# Patient Record
Sex: FEMALE | Race: WHITE | Employment: UNEMPLOYED | URBAN - METROPOLITAN AREA
[De-identification: names, ages, dates, MRNs, and addresses within clinical notes are randomized per-mention and may not be internally consistent; named-entity substitution may affect disease eponyms.]

---

## 2020-09-22 LAB
CHLAMYDIA, EXTERNAL: NEGATIVE
HBSAG, EXTERNAL: NEGATIVE
N. GONORRHEA, EXTERNAL: NEGATIVE
RPR, EXTERNAL: NON REACTIVE
RUBELLA, EXTERNAL: NORMAL
TYPE, ABO & RH, EXTERNAL: NORMAL

## 2021-02-13 ENCOUNTER — HOSPITAL ENCOUNTER (EMERGENCY)
Age: 26
Discharge: HOME | End: 2021-02-13
Payer: COMMERCIAL

## 2021-02-13 VITALS
HEIGHT: 63 IN | WEIGHT: 240 LBS | HEART RATE: 118 BPM | DIASTOLIC BLOOD PRESSURE: 68 MMHG | SYSTOLIC BLOOD PRESSURE: 120 MMHG | TEMPERATURE: 97.7 F | RESPIRATION RATE: 14 BRPM | BODY MASS INDEX: 42.52 KG/M2

## 2021-02-13 LAB
APPEARANCE UR: CLEAR
BACTERIA URNS QL MICRO: NEGATIVE /HPF
BILIRUB UR QL: NEGATIVE
CLUE CELLS VAG QL WET PREP: NORMAL
COLOR UR: NORMAL
EPITH CASTS URNS QL MICRO: NORMAL /LPF
GLUCOSE UR STRIP.AUTO-MCNC: NEGATIVE MG/DL
HGB UR QL STRIP: NEGATIVE
HYALINE CASTS URNS QL MICRO: NORMAL /LPF (ref 0–5)
KETONES UR QL STRIP.AUTO: NEGATIVE MG/DL
KOH PREP SPEC: NORMAL
LEUKOCYTE ESTERASE UR QL STRIP.AUTO: NEGATIVE
NITRITE UR QL STRIP.AUTO: NEGATIVE
PH UR STRIP: 6 [PH] (ref 5–8)
PROT UR STRIP-MCNC: NEGATIVE MG/DL
RBC #/AREA URNS HPF: NORMAL /HPF (ref 0–5)
SERVICE CMNT-IMP: NORMAL
SP GR UR REFRACTOMETRY: 1 (ref 1–1.03)
T VAGINALIS VAG QL WET PREP: NORMAL
UA: UC IF INDICATED,UAUC: NORMAL
UROBILINOGEN UR QL STRIP.AUTO: 0.2 EU/DL (ref 0.2–1)
WBC URNS QL MICRO: NORMAL /HPF (ref 0–4)

## 2021-02-13 PROCEDURE — 87210 SMEAR WET MOUNT SALINE/INK: CPT

## 2021-02-13 PROCEDURE — 99283 EMERGENCY DEPT VISIT LOW MDM: CPT

## 2021-02-13 PROCEDURE — 81001 URINALYSIS AUTO W/SCOPE: CPT

## 2021-02-13 RX ORDER — ASPIRIN 81 MG/1
81 TABLET ORAL DAILY
COMMUNITY
End: 2021-04-11

## 2021-02-13 NOTE — ED PROVIDER NOTES
Chris Bone is a 29 yo  at [place] [address] with an GLENDA of 21. She presents to the ZOIE for vaginal bleeding. Reports she voided this AM and noted a small streak of pink on the toilet paper. She went on with her day and noted none on panties or in toilet. She again voided this afternoon and noted light pink on toilet paper again, none in toilet. Wore a pad into ZOIE no bleeding noted. Denies recent intercourse. Denies constipation. Denies cramps, ctx, LOF and endorses good fetal movement. No other concerns today. Prenatal care has been received at [facility] with Dr. Jason Jones. Pregnancy complicated by maternal obesity, taking a daily ASA, last EFW >99% at 32 weeks (LGA fetus). Pt with a family history of Downs Syndrome, normal genetic screening with this pregnancy. The history is provided by the patient. No past medical history on file. No past surgical history on file. No family history on file.     Social History     Socioeconomic History    Marital status: Not on file     Spouse name: Not on file    Number of children: Not on file    Years of education: Not on file    Highest education level: Not on file   Occupational History    Not on file   Social Needs    Financial resource strain: Not on file    Food insecurity     Worry: Not on file     Inability: Not on file    Transportation needs     Medical: Not on file     Non-medical: Not on file   Tobacco Use    Smoking status: Not on file   Substance and Sexual Activity    Alcohol use: Not on file    Drug use: Not on file    Sexual activity: Not on file   Lifestyle    Physical activity     Days per week: Not on file     Minutes per session: Not on file    Stress: Not on file   Relationships    Social connections     Talks on phone: Not on file     Gets together: Not on file     Attends Mu-ism service: Not on file     Active member of club or organization: Not on file     Attends meetings of clubs or organizations: Not on file Relationship status: Not on file    Intimate partner violence     Fear of current or ex partner: Not on file     Emotionally abused: Not on file     Physically abused: Not on file     Forced sexual activity: Not on file   Other Topics Concern    Not on file   Social History Narrative    Not on file         ALLERGIES: Patient has no allergy information on record. Review of Systems   Constitutional: Negative. HENT: Negative. Eyes: Negative. Respiratory: Negative. Cardiovascular: Negative. Gastrointestinal: Negative. Endocrine: Negative. Genitourinary: Positive for vaginal bleeding. Musculoskeletal: Negative. Skin: Negative. Allergic/Immunologic: Negative. Neurological: Negative. Hematological: Negative. Psychiatric/Behavioral: Negative. There were no vitals filed for this visit. Physical Exam  Vitals signs and nursing note reviewed. Exam conducted with a chaperone present. Constitutional:       Appearance: Normal appearance. She is obese. HENT:      Head: Normocephalic and atraumatic. Nose: Nose normal.      Mouth/Throat:      Mouth: Mucous membranes are moist.      Pharynx: Oropharynx is clear. Eyes:      Extraocular Movements: Extraocular movements intact. Neck:      Musculoskeletal: Normal range of motion and neck supple. Cardiovascular:      Rate and Rhythm: Normal rate and regular rhythm. Pulses: Normal pulses. Heart sounds: Normal heart sounds. Pulmonary:      Effort: Pulmonary effort is normal.      Breath sounds: Normal breath sounds. Abdominal:      Palpations: Abdomen is soft. Comments: gravid   Genitourinary:     Comments: Sterile speculum exam:    Cervix posterior, 0/0/high  No blood noted in vault, from cervix or anywhere  No blood noted externally on vulva    Hemorrhoid noted, no bleeding noted    No visible blood noted anywhere with exam  Musculoskeletal: Normal range of motion.    Skin:     General: Skin is warm and dry. Capillary Refill: Capillary refill takes less than 2 seconds. Neurological:      General: No focal deficit present. Mental Status: She is alert and oriented to person, place, and time. Mental status is at baseline. Psychiatric:         Mood and Affect: Mood normal.         Behavior: Behavior normal.         Thought Content: Thought content normal.         Judgment: Judgment normal.      NST: Monitored for 20 minutes, reactive, cat 1, baseline: 150, positive accels, no decels, moderate variability, no ctx noted    MDM  Number of Diagnoses or Management Options     Amount and/or Complexity of Data Reviewed  Clinical lab tests: ordered and reviewed  Decide to obtain previous medical records or to obtain history from someone other than the patient: yes  Review and summarize past medical records: yes  Independent visualization of images, tracings, or specimens: yes    Risk of Complications, Morbidity, and/or Mortality  Presenting problems: moderate  Diagnostic procedures: moderate  Management options: moderate    Critical Care  Total time providing critical care:  minutes    Patient Progress  Patient progress: stable    ED Course as of Feb 13 1822   Sat Feb 13, 2021   1624 Admit to ZOIE  NST  Sterile speculum exam  Wet Prep  Ua with reflex culture      [LA]   1819 DILIP, OTHER SOURCES:    Special Requests: NO SPECIAL REQUESTS   KOH NO YEAST SEEN [LA]   1819 URINALYSIS W/ REFLEX CULTURE:    Color YELLOW/STRAW   Appearance CLEAR   Specific gravity 1.005   pH (UA) 6.0   Protein Negative   Glucose Negative   Ketone Negative   Bilirubin Negative   Blood Negative   Urobilinogen 0.2   Nitrites Negative   Leukocyte Esterase Negative   UA:UC IF INDICATED CULTURE NOT INDICATED BY UA RESULT   WBC 0-4   RBC 0-5   Epithelial cells FEW   Bacteria Negative   Hyaline cast 0-2 [LA]   1819 Reviewed normal results and no bleeding noted. Okay to discharge home.    Reviewed in detail to return with any vaginal bleeding, lof, ctx or decreased fetal movement. Pt verbalized understanding. Discharged home. Keep next routine OB appt 2/22/21 or ZOIE PRN.    WET PREP:    Clue cells CLUE CELLS ABSENT   Wet prep NO TRICHOMONAS SEEN [LA]      ED Course User Index  [LA] Lucrecia Vogel CNM

## 2021-02-13 NOTE — PROGRESS NOTES
2/13/2021  4:07 PM Patient arrived from home with vaginal bleeding. Patient reports blood tinged fluid on the toilet paper when she wipes. No active bleeding, no leaking of fluid, no contractions. Patient reports good fetal movement. [address] at bedside. No blood noted on exam.    1830 Patient okay to discharge home. .    [id number] Patient discharged home with instructions. Verbalizes understanding.

## 2021-02-13 NOTE — DISCHARGE INSTRUCTIONS
Patient Education     Vaginal Bleeding After the First Trimester of Pregnancy: After Your Visit  Your Care Instructions  Several things can cause bleeding in the second or third trimester of pregnancy. Some of them are serious, but others are not a cause for worry. In some cases, the cause of bleeding is not known. You do not have one of the serious conditions that can cause bleeding in pregnancy. Your baby is fine. However, you need to contact your doctor about any future bleeding, which could be a sign of  labor or other problems. Follow-up care is a key part of your treatment and safety. Be sure to make and go to all appointments, and call your doctor if you are having problems. Its also a good idea to know your test results and keep a list of the medicines you take. How can you care for yourself at home? · Do not have sex until your doctor says it is safe. · Follow your doctor's instructions about how much activity is safe for you. When should you call for help? Call 911 anytime you think you may need emergency care. For example, call if:  · You passed out (lost consciousness). · You have severe vaginal bleeding. · You have severe pain in your belly or pelvis. · You have had fluid gushing or leaking from your vagina and you know or think the umbilical cord is bulging into your vagina. If this happens, immediately get down on your knees so your rear end (buttocks) is higher than your head. This will decrease the pressure on the cord until help arrives. Call your doctor now or seek immediate medical care if:  · You have signs of preeclampsia, such as:  ¨ Sudden swelling of your face, hands, or feet. ¨ New vision problems (such as dimness or blurring). ¨ A severe headache. · You have any vaginal bleeding. · You have belly pain or cramping. · You have a fever. · You have had regular contractions (with or without pain) for an hour.  This means that you have 8 or more within 1 hour or 4 or more in 20 minutes after you change your position and drink fluids. · You have a sudden release of fluid from the vagina. · You have low back pain or pelvic pressure that does not go away. · You notice that your baby has stopped moving or is moving much less than normal.  Watch closely for changes in your health, and be sure to contact your doctor if:  · You do not get better as expected. Where can you learn more? Go to [employer]  Enter I318 in the search box to learn more about \"Vaginal Bleeding After the First Trimester of Pregnancy: After Your Visit. \"   © 4097-7494 Healthwise, Incorporated. Care instructions adapted under license by [employer] (which disclaims liability or warranty for this information). This care instruction is for use with your licensed healthcare professional. If you have questions about a medical condition or this instruction, always ask your healthcare professional. [address] any warranty or liability for your use of this information. Content Version: 9.4.40471;  Last Revised: March 24, 2011

## 2021-03-16 LAB — GRBS, EXTERNAL: NEGATIVE

## 2021-03-26 ENCOUNTER — TRANSCRIBE ORDER (OUTPATIENT)
Dept: REGISTRATION | Age: 26
End: 2021-03-26

## 2021-03-26 ENCOUNTER — HOSPITAL ENCOUNTER (OUTPATIENT)
Dept: PREADMISSION TESTING | Age: 26
Discharge: HOME | End: 2021-03-26
Payer: COMMERCIAL

## 2021-03-26 DIAGNOSIS — Z01.812 PRE-PROCEDURE LAB EXAM: ICD-10-CM

## 2021-03-26 DIAGNOSIS — Z01.812 PRE-PROCEDURE LAB EXAM: Primary | ICD-10-CM

## 2021-03-26 PROCEDURE — U0003 INFECTIOUS AGENT DETECTION BY NUCLEIC ACID (DNA OR RNA); SEVERE ACUTE RESPIRATORY SYNDROME CORONAVIRUS 2 (SARS-COV-2) (CORONAVIRUS DISEASE [COVID-19]), AMPLIFIED PROBE TECHNIQUE, MAKING USE OF HIGH THROUGHPUT TECHNOLOGIES AS DESCRIBED BY CMS-2020-01-R: HCPCS

## 2021-03-27 LAB — SARS-COV-2, COV2NT: NOT DETECTED

## 2021-04-08 ENCOUNTER — HOSPITAL ENCOUNTER (INPATIENT)
Age: 26
LOS: 3 days | Discharge: HOME | End: 2021-04-11
Attending: OBSTETRICS & GYNECOLOGY | Admitting: OBSTETRICS & GYNECOLOGY
Payer: COMMERCIAL

## 2021-04-08 PROBLEM — O36.60X0 LARGE FOR GESTATIONAL AGE FETUS AFFECTING MANAGEMENT OF MOTHER: Status: ACTIVE | Noted: 2021-04-08

## 2021-04-08 PROBLEM — O99.210 OBESITY COMPLICATING PREGNANCY: Status: ACTIVE | Noted: 2021-04-08

## 2021-04-08 LAB
BASOPHILS # BLD: 0.1 K/UL (ref 0–0.1)
BASOPHILS NFR BLD: 1 % (ref 0–1)
DIFFERENTIAL METHOD BLD: ABNORMAL
EOSINOPHIL # BLD: 0.1 K/UL (ref 0–0.4)
EOSINOPHIL NFR BLD: 1 % (ref 0–7)
ERYTHROCYTE [DISTWIDTH] IN BLOOD BY AUTOMATED COUNT: 14.2 % (ref 11.5–14.5)
HCT VFR BLD AUTO: 31.5 % (ref 35–47)
HGB BLD-MCNC: 10.3 G/DL (ref 11.5–16)
IMM GRANULOCYTES # BLD AUTO: 0.1 K/UL (ref 0–0.04)
IMM GRANULOCYTES NFR BLD AUTO: 1 % (ref 0–0.5)
LYMPHOCYTES # BLD: 2 K/UL (ref 0.8–3.5)
LYMPHOCYTES NFR BLD: 16 % (ref 12–49)
MCH RBC QN AUTO: 25.9 PG (ref 26–34)
MCHC RBC AUTO-ENTMCNC: 32.7 G/DL (ref 30–36.5)
MCV RBC AUTO: 79.1 FL (ref 80–99)
MONOCYTES # BLD: 0.9 K/UL (ref 0–1)
MONOCYTES NFR BLD: 7 % (ref 5–13)
NEUTS SEG # BLD: 9.8 K/UL (ref 1.8–8)
NEUTS SEG NFR BLD: 74 % (ref 32–75)
NRBC # BLD: 0 K/UL (ref 0–0.01)
NRBC BLD-RTO: 0 PER 100 WBC
PLATELET # BLD AUTO: 224 K/UL (ref 150–400)
PMV BLD AUTO: 12.1 FL (ref 8.9–12.9)
RBC # BLD AUTO: 3.98 M/UL (ref 3.8–5.2)
WBC # BLD AUTO: 12.9 K/UL (ref 3.6–11)

## 2021-04-08 PROCEDURE — 77030028565 HC CATH CERV RIPNG BLN COOK -B

## 2021-04-08 PROCEDURE — 36415 COLL VENOUS BLD VENIPUNCTURE: CPT

## 2021-04-08 PROCEDURE — 3E033VJ INTRODUCTION OF OTHER HORMONE INTO PERIPHERAL VEIN, PERCUTANEOUS APPROACH: ICD-10-PCS | Performed by: MIDWIFE

## 2021-04-08 PROCEDURE — 59200 INSERT CERVICAL DILATOR: CPT

## 2021-04-08 PROCEDURE — 75410000002 HC LABOR FEE PER 1 HR

## 2021-04-08 PROCEDURE — 00HU33Z INSERTION OF INFUSION DEVICE INTO SPINAL CANAL, PERCUTANEOUS APPROACH: ICD-10-PCS | Performed by: ANESTHESIOLOGY

## 2021-04-08 PROCEDURE — 0KQM0ZZ REPAIR PERINEUM MUSCLE, OPEN APPROACH: ICD-10-PCS | Performed by: MIDWIFE

## 2021-04-08 PROCEDURE — 74011250636 HC RX REV CODE- 250/636: Performed by: OBSTETRICS & GYNECOLOGY

## 2021-04-08 PROCEDURE — 65270000029 HC RM PRIVATE

## 2021-04-08 PROCEDURE — 74011250636 HC RX REV CODE- 250/636: Performed by: ADVANCED PRACTICE MIDWIFE

## 2021-04-08 PROCEDURE — 85025 COMPLETE CBC W/AUTO DIFF WBC: CPT

## 2021-04-08 RX ORDER — OXYTOCIN/RINGER'S LACTATE 30/500 ML
10 PLASTIC BAG, INJECTION (ML) INTRAVENOUS AS NEEDED
Status: COMPLETED | OUTPATIENT
Start: 2021-04-08 | End: 2021-04-09

## 2021-04-08 RX ORDER — BUTORPHANOL TARTRATE 1 MG/ML
1 INJECTION INTRAMUSCULAR; INTRAVENOUS
Status: DISCONTINUED | OUTPATIENT
Start: 2021-04-08 | End: 2021-04-08

## 2021-04-08 RX ORDER — ONDANSETRON 2 MG/ML
4 INJECTION INTRAMUSCULAR; INTRAVENOUS
Status: DISCONTINUED | OUTPATIENT
Start: 2021-04-08 | End: 2021-04-10 | Stop reason: HOSPADM

## 2021-04-08 RX ORDER — SODIUM CHLORIDE 0.9 % (FLUSH) 0.9 %
5-40 SYRINGE (ML) INJECTION AS NEEDED
Status: DISCONTINUED | OUTPATIENT
Start: 2021-04-08 | End: 2021-04-11 | Stop reason: HOSPADM

## 2021-04-08 RX ORDER — SODIUM CHLORIDE, SODIUM LACTATE, POTASSIUM CHLORIDE, CALCIUM CHLORIDE 600; 310; 30; 20 MG/100ML; MG/100ML; MG/100ML; MG/100ML
125 INJECTION, SOLUTION INTRAVENOUS CONTINUOUS
Status: DISCONTINUED | OUTPATIENT
Start: 2021-04-08 | End: 2021-04-11 | Stop reason: HOSPADM

## 2021-04-08 RX ORDER — OXYTOCIN/RINGER'S LACTATE 30/500 ML
87.3 PLASTIC BAG, INJECTION (ML) INTRAVENOUS AS NEEDED
Status: DISCONTINUED | OUTPATIENT
Start: 2021-04-08 | End: 2021-04-11 | Stop reason: HOSPADM

## 2021-04-08 RX ORDER — SODIUM CHLORIDE 0.9 % (FLUSH) 0.9 %
5-40 SYRINGE (ML) INJECTION EVERY 8 HOURS
Status: DISCONTINUED | OUTPATIENT
Start: 2021-04-08 | End: 2021-04-11 | Stop reason: HOSPADM

## 2021-04-08 RX ORDER — NALOXONE HYDROCHLORIDE 0.4 MG/ML
0.4 INJECTION, SOLUTION INTRAMUSCULAR; INTRAVENOUS; SUBCUTANEOUS AS NEEDED
Status: DISCONTINUED | OUTPATIENT
Start: 2021-04-08 | End: 2021-04-10 | Stop reason: HOSPADM

## 2021-04-08 RX ORDER — BUTORPHANOL TARTRATE 1 MG/ML
2 INJECTION INTRAMUSCULAR; INTRAVENOUS
Status: COMPLETED | OUTPATIENT
Start: 2021-04-08 | End: 2021-04-09

## 2021-04-08 RX ORDER — OXYTOCIN/RINGER'S LACTATE 30/500 ML
1 PLASTIC BAG, INJECTION (ML) INTRAVENOUS
Status: DISCONTINUED | OUTPATIENT
Start: 2021-04-09 | End: 2021-04-09

## 2021-04-08 RX ORDER — ZOLPIDEM TARTRATE 5 MG/1
5 TABLET ORAL
Status: DISCONTINUED | OUTPATIENT
Start: 2021-04-08 | End: 2021-04-11 | Stop reason: HOSPADM

## 2021-04-08 RX ADMIN — BUTORPHANOL TARTRATE 1 MG: 1 INJECTION, SOLUTION INTRAMUSCULAR; INTRAVENOUS at 21:47

## 2021-04-08 RX ADMIN — PROMETHAZINE HYDROCHLORIDE 12.5 MG: 25 INJECTION INTRAMUSCULAR; INTRAVENOUS at 22:05

## 2021-04-08 RX ADMIN — BUTORPHANOL TARTRATE 1 MG: 1 INJECTION, SOLUTION INTRAMUSCULAR; INTRAVENOUS at 23:26

## 2021-04-08 RX ADMIN — SODIUM CHLORIDE, POTASSIUM CHLORIDE, SODIUM LACTATE AND CALCIUM CHLORIDE 500 ML: 600; 310; 30; 20 INJECTION, SOLUTION INTRAVENOUS at 21:45

## 2021-04-08 NOTE — H&P
History & Physical    Name: Rosa Iraheta MRN: [id number]  SSN: [id number]    YOB: 1995  Age: 30 y.o. Sex: female      Subjective:     Estimated Date of Delivery: 21  OB History    Para Term  AB Living   1             SAB TAB Ectopic Molar Multiple Live Births                    # Outcome Date GA Lbr Bronson/2nd Weight Sex Delivery Anes PTL Lv   1 Current                Ms. Denise Luna is admitted with pregnancy at 40w0d for induction of labor due to obesity and LGA baby at term. Baby noted to be 91st%ile on  (Pj Hairston). She is obese with a BMI of 41. Prenatal course was otherwise uncomplicated. Please see prenatal records for details. She notes active FM and denies labor complaints. History reviewed. No pertinent past medical history. History reviewed. No pertinent surgical history. Social History     Occupational History    Not on file   Tobacco Use    Smoking status: Never Smoker    Smokeless tobacco: Never Used   Substance and Sexual Activity    Alcohol use: Not Currently    Drug use: Never    Sexual activity: Not on file     History reviewed. No pertinent family history. No Known Allergies  Prior to Admission medications    Medication Sig Start Date End Date Taking? Authorizing Provider   PNV Comb #2-Iron-Omega 3-FA [id number] mg cmpk Take  by mouth. Yes Provider, Historical   aspirin delayed-release 81 mg tablet Take 81 mg by mouth daily. Yes Provider, Historical   doxylamine succinate (UNISOM) 25 mg tablet Take 25 mg by mouth nightly as needed.    Yes Provider, Historical        Review of Systems   Noted in HPI    Objective:     Vitals:  Vitals:    21 1612 21 1619   BP: 125/81    Pulse: (!) 136    Resp: 14    Temp: 98.1 °F (36.7 °C)    Weight:  117.9 kg (260 lb)        Physical Exam      Gen: NAD  Resp: non-labored  CV: RRR  Abd: soft, gravid, obese  Ext: mild b/l edema    EFW 9#    Cervix: 0WP/93%/-9, intact, cephalic    Category 1 fetatl heart tracing  Baseline 145, moderate variability, + accels, no decels      Prenatal Labs:    Lab Results   Component Value Date/Time    Rubella, External Immune 09/22/2020    GrBStrep, External Negative 03/16/2021    HBsAg, External Negative 09/22/2020    RPR, External Non reactive 09/22/2020    Gonorrhea, External Negative 09/22/2020    Chlamydia, External Negative 09/22/2020    ABO,Rh A Positive 09/22/2020          Impression/Plan:     Active Problems:    Pregnancy (4/8/2021)      Obesity complicating pregnancy (6/4/6258)      Large for gestational age fetus affecting management of mother (4/8/2021)       G1 40 0/7 IOL for obesity (BMI 39) and LGA baby (Marta Montoya)    Plan:  Admit for induction of labor. Group B Strep negative. Plan cook catheter placement and pitocin at 0400. Consented x 2.  COVID neg. Reviewed risks of shoulder dystocia (which had already been reviewed with the patient) and she accepts risks and would like to proceed forward with IOL.       Myron Levy MD

## 2021-04-08 NOTE — PROGRESS NOTES
4/8/2021  4:08 PM Patient arrived to [address] [place] [address] for scheduled induction for LGA and morbid obesity. Patient reports positive fetal movement and denies any bleeding or loss of fluid. 1710: Dr Paulie Youssef at bedside to see patient and discuss plan of care. SVE 1/60/-3. King bulb placed at this time, 60cc uterine, 60cc vaginal. Dr Paulie Youssef aware of MEWS score 3.  1915: Dr Paulie Youssef reviewed [address] tracing. Difficult to continiously trace due to maternal size. Per Dr Paulie Youssef, patient may come off monitors now and do an NST before bedtime and one before pitocin starts. 1940: Bedside and Verbal shift change report given to Leta AUSTIN (oncoming nurse) by Olivia Marie (offgoing nurse). Report included the following information SBAR, Kardex, Intake/Output, MAR, Accordion, Recent Results and Med Rec Status.

## 2021-04-08 NOTE — PROGRESS NOTES
[place] catheter placed with 60/60 inflated  Pt and baby tolerated well  Plan for pitocin at 0400  NST and if reactive ok for intermittent monitoring until pitocin starts    Dalila Gaffney MD

## 2021-04-09 ENCOUNTER — ANESTHESIA EVENT (OUTPATIENT)
Dept: LABOR AND DELIVERY | Age: 26
End: 2021-04-09
Payer: COMMERCIAL

## 2021-04-09 ENCOUNTER — ANESTHESIA (OUTPATIENT)
Dept: LABOR AND DELIVERY | Age: 26
End: 2021-04-09
Payer: COMMERCIAL

## 2021-04-09 PROCEDURE — 74011250636 HC RX REV CODE- 250/636: Performed by: ANESTHESIOLOGY

## 2021-04-09 PROCEDURE — 74011250636 HC RX REV CODE- 250/636: Performed by: ADVANCED PRACTICE MIDWIFE

## 2021-04-09 PROCEDURE — 74011000250 HC RX REV CODE- 250: Performed by: ANESTHESIOLOGY

## 2021-04-09 PROCEDURE — 75410000003 HC RECOV DEL/VAG/CSECN EA 0.5 HR

## 2021-04-09 PROCEDURE — 74011250636 HC RX REV CODE- 250/636: Performed by: OBSTETRICS & GYNECOLOGY

## 2021-04-09 PROCEDURE — 75410000002 HC LABOR FEE PER 1 HR

## 2021-04-09 PROCEDURE — 76060000078 HC EPIDURAL ANESTHESIA

## 2021-04-09 PROCEDURE — 74011250637 HC RX REV CODE- 250/637: Performed by: MIDWIFE

## 2021-04-09 PROCEDURE — 75410000000 HC DELIVERY VAGINAL/SINGLE

## 2021-04-09 PROCEDURE — 65270000029 HC RM PRIVATE

## 2021-04-09 PROCEDURE — 74011000250 HC RX REV CODE- 250: Performed by: OBSTETRICS & GYNECOLOGY

## 2021-04-09 RX ORDER — FENTANYL CITRATE 50 UG/ML
INJECTION, SOLUTION INTRAMUSCULAR; INTRAVENOUS
Status: COMPLETED
Start: 2021-04-09 | End: 2021-04-09

## 2021-04-09 RX ORDER — OXYTOCIN/RINGER'S LACTATE 30/500 ML
87.3 PLASTIC BAG, INJECTION (ML) INTRAVENOUS AS NEEDED
Status: DISCONTINUED | OUTPATIENT
Start: 2021-04-09 | End: 2021-04-11 | Stop reason: HOSPADM

## 2021-04-09 RX ORDER — SODIUM CHLORIDE 0.9 % (FLUSH) 0.9 %
5-40 SYRINGE (ML) INJECTION EVERY 8 HOURS
Status: DISCONTINUED | OUTPATIENT
Start: 2021-04-09 | End: 2021-04-11 | Stop reason: HOSPADM

## 2021-04-09 RX ORDER — OXYTOCIN/RINGER'S LACTATE 30/500 ML
0-20 PLASTIC BAG, INJECTION (ML) INTRAVENOUS
Status: DISCONTINUED | OUTPATIENT
Start: 2021-04-09 | End: 2021-04-11 | Stop reason: HOSPADM

## 2021-04-09 RX ORDER — SODIUM CHLORIDE 0.9 % (FLUSH) 0.9 %
5-40 SYRINGE (ML) INJECTION AS NEEDED
Status: DISCONTINUED | OUTPATIENT
Start: 2021-04-09 | End: 2021-04-11 | Stop reason: HOSPADM

## 2021-04-09 RX ORDER — ACETAMINOPHEN 325 MG/1
650 TABLET ORAL
Status: DISCONTINUED | OUTPATIENT
Start: 2021-04-09 | End: 2021-04-11 | Stop reason: HOSPADM

## 2021-04-09 RX ORDER — FENTANYL CITRATE 50 UG/ML
INJECTION, SOLUTION INTRAMUSCULAR; INTRAVENOUS AS NEEDED
Status: DISCONTINUED | OUTPATIENT
Start: 2021-04-09 | End: 2021-04-09 | Stop reason: HOSPADM

## 2021-04-09 RX ORDER — HYDROCORTISONE 1 %
CREAM (GRAM) TOPICAL AS NEEDED
Status: DISCONTINUED | OUTPATIENT
Start: 2021-04-09 | End: 2021-04-11 | Stop reason: HOSPADM

## 2021-04-09 RX ORDER — BUPIVACAINE HYDROCHLORIDE 2.5 MG/ML
30 INJECTION, SOLUTION EPIDURAL; INFILTRATION; INTRACAUDAL ONCE
Status: DISCONTINUED | OUTPATIENT
Start: 2021-04-09 | End: 2021-04-10 | Stop reason: HOSPADM

## 2021-04-09 RX ORDER — HYDROCODONE BITARTRATE AND ACETAMINOPHEN 5; 325 MG/1; MG/1
1 TABLET ORAL
Status: DISCONTINUED | OUTPATIENT
Start: 2021-04-09 | End: 2021-04-11 | Stop reason: HOSPADM

## 2021-04-09 RX ORDER — FENTANYL/BUPIVACAINE/NS/PF 2-1250MCG
1-16 PREFILLED PUMP RESERVOIR EPIDURAL CONTINUOUS
Status: DISCONTINUED | OUTPATIENT
Start: 2021-04-09 | End: 2021-04-11 | Stop reason: HOSPADM

## 2021-04-09 RX ORDER — FENTANYL CITRATE 50 UG/ML
100 INJECTION, SOLUTION INTRAMUSCULAR; INTRAVENOUS ONCE
Status: DISCONTINUED | OUTPATIENT
Start: 2021-04-09 | End: 2021-04-10 | Stop reason: HOSPADM

## 2021-04-09 RX ORDER — AMMONIA 15 % (W/V)
1 AMPUL (EA) INHALATION AS NEEDED
Status: DISCONTINUED | OUTPATIENT
Start: 2021-04-09 | End: 2021-04-11 | Stop reason: HOSPADM

## 2021-04-09 RX ORDER — HYDROCORTISONE ACETATE PRAMOXINE HCL 2.5; 1 G/100G; G/100G
CREAM TOPICAL AS NEEDED
Status: DISCONTINUED | OUTPATIENT
Start: 2021-04-09 | End: 2021-04-11 | Stop reason: HOSPADM

## 2021-04-09 RX ORDER — HYDROCODONE BITARTRATE AND ACETAMINOPHEN 7.5; 325 MG/1; MG/1
1 TABLET ORAL
Status: DISCONTINUED | OUTPATIENT
Start: 2021-04-09 | End: 2021-04-11 | Stop reason: HOSPADM

## 2021-04-09 RX ORDER — ONDANSETRON 4 MG/1
4 TABLET, ORALLY DISINTEGRATING ORAL
Status: DISCONTINUED | OUTPATIENT
Start: 2021-04-09 | End: 2021-04-11 | Stop reason: HOSPADM

## 2021-04-09 RX ORDER — BUPIVACAINE HYDROCHLORIDE 2.5 MG/ML
INJECTION, SOLUTION EPIDURAL; INFILTRATION; INTRACAUDAL
Status: COMPLETED
Start: 2021-04-09 | End: 2021-04-09

## 2021-04-09 RX ORDER — IBUPROFEN 400 MG/1
800 TABLET ORAL EVERY 8 HOURS
Status: DISCONTINUED | OUTPATIENT
Start: 2021-04-09 | End: 2021-04-11 | Stop reason: HOSPADM

## 2021-04-09 RX ORDER — SIMETHICONE 80 MG
80 TABLET,CHEWABLE ORAL
Status: DISCONTINUED | OUTPATIENT
Start: 2021-04-09 | End: 2021-04-11 | Stop reason: HOSPADM

## 2021-04-09 RX ORDER — OXYTOCIN/RINGER'S LACTATE 30/500 ML
10 PLASTIC BAG, INJECTION (ML) INTRAVENOUS AS NEEDED
Status: DISCONTINUED | OUTPATIENT
Start: 2021-04-09 | End: 2021-04-11 | Stop reason: HOSPADM

## 2021-04-09 RX ORDER — NALOXONE HYDROCHLORIDE 0.4 MG/ML
0.4 INJECTION, SOLUTION INTRAMUSCULAR; INTRAVENOUS; SUBCUTANEOUS AS NEEDED
Status: DISCONTINUED | OUTPATIENT
Start: 2021-04-09 | End: 2021-04-10 | Stop reason: HOSPADM

## 2021-04-09 RX ORDER — DOCUSATE SODIUM 100 MG/1
100 CAPSULE, LIQUID FILLED ORAL
Status: DISCONTINUED | OUTPATIENT
Start: 2021-04-09 | End: 2021-04-11 | Stop reason: HOSPADM

## 2021-04-09 RX ORDER — ZOLPIDEM TARTRATE 5 MG/1
5 TABLET ORAL
Status: DISCONTINUED | OUTPATIENT
Start: 2021-04-09 | End: 2021-04-11 | Stop reason: HOSPADM

## 2021-04-09 RX ORDER — BUPIVACAINE HYDROCHLORIDE 5 MG/ML
30 INJECTION, SOLUTION EPIDURAL; INTRACAUDAL AS NEEDED
Status: DISCONTINUED | OUTPATIENT
Start: 2021-04-09 | End: 2021-04-10 | Stop reason: HOSPADM

## 2021-04-09 RX ORDER — EPHEDRINE SULFATE/0.9% NACL/PF 50 MG/5 ML
12.5 SYRINGE (ML) INTRAVENOUS
Status: DISCONTINUED | OUTPATIENT
Start: 2021-04-09 | End: 2021-04-10 | Stop reason: HOSPADM

## 2021-04-09 RX ORDER — BUPIVACAINE HYDROCHLORIDE 2.5 MG/ML
INJECTION, SOLUTION EPIDURAL; INFILTRATION; INTRACAUDAL AS NEEDED
Status: DISCONTINUED | OUTPATIENT
Start: 2021-04-09 | End: 2021-04-09 | Stop reason: HOSPADM

## 2021-04-09 RX ADMIN — BUTORPHANOL TARTRATE 2 MG: 1 INJECTION, SOLUTION INTRAMUSCULAR; INTRAVENOUS at 02:43

## 2021-04-09 RX ADMIN — OXYTOCIN 1 MILLI-UNITS/MIN: 10 INJECTION INTRAVENOUS at 04:29

## 2021-04-09 RX ADMIN — BUPIVACAINE HYDROCHLORIDE 5 ML: 2.5 INJECTION, SOLUTION EPIDURAL; INFILTRATION; INTRACAUDAL; PERINEURAL at 16:47

## 2021-04-09 RX ADMIN — OXYTOCIN 10000 MILLI-UNITS: 10 INJECTION INTRAVENOUS at 21:38

## 2021-04-09 RX ADMIN — BUPIVACAINE HYDROCHLORIDE 5 ML: 2.5 INJECTION, SOLUTION EPIDURAL; INFILTRATION; INTRACAUDAL; PERINEURAL at 16:49

## 2021-04-09 RX ADMIN — Medication 10 ML/HR: at 17:00

## 2021-04-09 RX ADMIN — IBUPROFEN 800 MG: 400 TABLET, FILM COATED ORAL at 22:57

## 2021-04-09 RX ADMIN — FENTANYL CITRATE 100 MCG: 50 INJECTION, SOLUTION INTRAMUSCULAR; INTRAVENOUS at 16:49

## 2021-04-09 RX ADMIN — FAMOTIDINE 20 MG: 10 INJECTION INTRAVENOUS at 20:29

## 2021-04-09 NOTE — PROGRESS NOTES
Labor Note    S: Pt feeling more pain with ctx. O:.  Visit Vitals  BP (!) 141/85 (BP 1 Location: Left upper arm, BP Patient Position: Sitting)   Pulse 100   Temp 98.3 °F (36.8 °C)   Resp 16   Wt 117.9 kg (260 lb)   Breastfeeding No   BMI 46.06 kg/m²     Gen- NAD  Abdomen- soft, gravid, NT  Cervix- 6/70/-2  AROM with return of clear fluid at 1545  Alsen- ctx q4 min, pit at 14 mU/min  FHTs- category 1    A/P: 29 y/o G1 with IUP at 40 1/7 wks undergoing IOL due to morbid obesity (BMI 41) and LGA baby (91st%ile on 4/5 (9lb3oz). GBS neg and COVID neg. Dystocia risks previously reviewed.    -Continue pit and continuous FM  -Epidural prn for pt comfort  -Mild range BPs, no sx, if persistent with send preE labs

## 2021-04-09 NOTE — PROGRESS NOTES
0050: Report received from [facility], RN.  7623: Patient up to bathroom.   9411: Report given to Adrian Robledo RN

## 2021-04-09 NOTE — ANESTHESIA PREPROCEDURE EVALUATION
Relevant Problems   ENDOCRINE   (+) Obesity complicating pregnancy       Anesthetic History   No history of anesthetic complications            Review of Systems / Medical History  Patient summary reviewed, nursing notes reviewed and pertinent labs reviewed    Pulmonary  Within defined limits                 Neuro/Psych   Within defined limits           Cardiovascular  Within defined limits                     GI/Hepatic/Renal  Within defined limits              Endo/Other        Morbid obesity     Other Findings              Physical Exam    Airway  Mallampati: II  TM Distance: > 6 cm  Neck ROM: normal range of motion   Mouth opening: Normal     Cardiovascular  Regular rate and rhythm,  S1 and S2 normal,  no murmur, click, rub, or gallop             Dental  No notable dental hx       Pulmonary  Breath sounds clear to auscultation               Abdominal  GI exam deferred       Other Findings            Anesthetic Plan    ASA: 3  Anesthesia type: epidural            Anesthetic plan and risks discussed with: Patient

## 2021-04-09 NOTE — PROGRESS NOTES
1130 Bedside and Verbal shift change report given to Kaur RNC (oncoming nurse) by Ziggy Hugo (offgoing nurse). Report included the following information SBAR, Kardex, Intake/Output, MAR, Accordion, Recent Results and Med Rec Status. 1535 Bedside and Verbal shift change report given to Hiram RNC (oncoming nurse) by Elizabeth Jovel (offgoing nurse). Report included the following information SBAR, Kardex, Intake/Output, MAR, Accordion, Recent Results and Med Rec Status.

## 2021-04-09 NOTE — PROGRESS NOTES
Labor Progress Note    S: Patient seen, fetal heart rate and contraction pattern evaluated. Feeling some stronger cramps. Wanting something for pain.     Physical Exam:  Patient Vitals for the past 4 hrs:   Temp Pulse Resp BP   21 2036 98.2 °F (36.8 °C) 96 17 135/87   21 1754 -- 100 -- 125/86         Cervical Exam: deferred, cook in place  Membranes:  Intact  Uterine Contractions:  Frequency: Irregular, mild to palpation, not currently on monitor  Fetal Heart Rate: previously 125s, +accels, neg decels, moderate variability      Assessment/Plan:    30 y.o.  at [facility] at term for obesity labor and LGA  Cat 1 tracing previously  GBS negative    P:  Assumed care of patient  Introduced self to patient  Continue present management as set by Dr. Sunita Langston  All questions asked/answered and patient agrees with plan    Alfonso Pantoja CNM

## 2021-04-09 NOTE — PROGRESS NOTES
1540: Bedside and Verbal shift change report given to [address] Siddiqui (oncoming nurse) by Alphonso Scanlon RN (offgoing nurse). Report included the following information SBAR, Intake/Output, MAR and Recent Results. 1545: Dr Yuan Newton at bedside to assess pt 6/70/-2.    1547: AROM, clear fluid. 1607: Pt requesting epidural. Bolus started. 1640: Dr Alec Manley at bedside for epidural placement. : Pt feeling constant rectal pressure    : lip/100/0    2015: 10/100/+1    : Begin pushing, RN at bedside, continuously monitoring FHR tracing    : RN at bedside, continuously monitoring FHR tracing    : RN at bedside, continuously monitoring FHR tracing    :  of live female  by [address] [place] [address]. Apgars 9/9.    0025: TRANSFER - OUT REPORT:    Verbal report given to MADELINE Corcoran RN(name) on Chadd Barrett  being transferred to MIU(unit) for routine progression of care       Report consisted of patients Situation, Background, Assessment and   Recommendations(SBAR). Information from the following report(s) SBAR, Intake/Output, MAR and Recent Results was reviewed with the receiving nurse. Lines:   Peripheral IV 21 Anterior;Distal;Left Wrist (Active)   Site Assessment Clean, dry, & intact 21 1203   Phlebitis Assessment 0 21 1203   Infiltration Assessment 0 21 1203   Dressing Status Clean, dry, & intact 21 1203   Dressing Type Transparent;Tape 21 1203   Hub Color/Line Status Pink; Infusing 21 1203        Opportunity for questions and clarification was provided.       Patient transported with:   Registered Nurse

## 2021-04-09 NOTE — ANESTHESIA PROCEDURE NOTES
Epidural Block    Patient location during procedure: OB  Start time: 4/9/2021 4:38 PM  End time: 4/9/2021 4:50 PM  Reason for block: labor epidural  Staffing  Performed: attending   Anesthesiologist: Inez Yin MD  Preanesthetic Checklist  Completed: patient identified, IV checked, site marked, risks and benefits discussed, surgical consent, monitors and equipment checked, pre-op evaluation and timeout performed  Block Placement  Patient position: sitting  Prep: ChloraPrep  Sterility prep: cap, drape, gloves, mask and hand  Sedation level: no sedation  Patient monitoring: continuous pulse oximetry, heart rate and frequent blood pressure checks  Approach: midline  Location: lumbar  Lumbar location: L4-L5  Epidural  Loss of resistance technique: saline  Guidance: landmark technique  Needle  Needle type: Tuohy   Needle gauge: 17 G  Needle length: 9 cm  Needle insertion depth: 7 cm  Catheter type: end hole  Catheter size: 19 G  Catheter at skin depth: 12 cm  Catheter securement method: clear occlusive dressing, liquid medical adhesive and surgical tape  Test dose: negative  Assessment  Sensory level: T6  Block outcome: pain improved  Number of attempts: 1  Procedure assessment: patient tolerated procedure well with no complications

## 2021-04-09 NOTE — PROGRESS NOTES
1940: Bedside shift change report given to FRANCISCO Bhatt RN (oncoming nurse) by NICA Guevara RN (offgoing nurse). Report included the following information SBAR.

## 2021-04-09 NOTE — PROGRESS NOTES
Pt seen and examined and case discussed at am rounds. Soha Goerge is a 31 y/o G1 with IUP at 40 1/7 wks undergoing IOL due to morbid obesity (BMI 41) and LGA baby (91st%ile on 4/5 (Adela Segura). GBS neg and COVID neg. Dystocia risks previously reviewed. Cervix was 1/60/-3 so cook catheter was placed last night. Pit was started at 0400 and is at 6 mU/min. FHTs category 1. Contractions irregular on toco. Pt comfortable. Elisha Oren catheter still in place. Will leave intact and continue pitocin. Plan to remove cook once regular contraction pattern established. AROM when amenable.

## 2021-04-09 NOTE — PROGRESS NOTES
0750-Bedside report from [facility]  403-NA encouraged to stand at bedside, leaning onto bed, rocking hips  1015-cervical ripening balloon d/c with slight traction

## 2021-04-10 PROCEDURE — 65410000002 HC RM PRIVATE OB

## 2021-04-10 PROCEDURE — 74011250637 HC RX REV CODE- 250/637: Performed by: MIDWIFE

## 2021-04-10 RX ADMIN — DOCUSATE SODIUM 100 MG: 100 CAPSULE ORAL at 16:39

## 2021-04-10 RX ADMIN — ACETAMINOPHEN 650 MG: 325 TABLET ORAL at 16:39

## 2021-04-10 RX ADMIN — IBUPROFEN 800 MG: 400 TABLET, FILM COATED ORAL at 06:33

## 2021-04-10 RX ADMIN — IBUPROFEN 800 MG: 400 TABLET, FILM COATED ORAL at 15:06

## 2021-04-10 NOTE — ROUTINE PROCESS
Verbal shift change report given to JOSE J Brink RN (oncoming nurse) by Zuleika Hilliard RN (offgoing nurse). Report included the following information SBAR, Intake/Output, MAR and Recent Results.

## 2021-04-10 NOTE — LACTATION NOTE
This note was copied from a baby's chart. Initial Lactation Consultation: Infant born vaginally last evening to a  mom at 36 1/7 weeks gestation. Mom noted breast changes during the pregnancy and has easily expressed colostrurm. Mom has large breasts and her nipples are large in diameter. Infant has been latching well; discussed proper alignment and traits of a proper latch. Assisted mom with positioning in the football and prone positions. Feeding Plan: Mother will keep baby skin to skin as often as possible, feed on demand, 8-12x/day , respond to feeding cues, obtain latch, listen for audible swallowing, be aware of signs of oxytocin release/ cramping,thirst,sleepiness while breastfeeding, offer both breasts,and will not limit feedings. Mother agrees to utilize breast massage while nursing to facilitate lactogenesis.

## 2021-04-10 NOTE — ROUTINE PROCESS
12- TRANSFER - IN REPORT:    Verbal report received from ROSEMARY Arrington RN (name) on Monique Barksdale  being received from L&D (unit) for routine progression of care      Report consisted of patients Situation, Background, Assessment and   Recommendations(SBAR). Information from the following report(s) SBAR was reviewed with the receiving nurse. Opportunity for questions and clarification was provided. Assessment completed upon patients arrival to unit and care assumed.

## 2021-04-10 NOTE — PROGRESS NOTES
Post-Partum Day Number 1 Progress Note    Monique Barksdale     Assessment: Doing well, post partum day 1    Plan:  1. Continue routine postpartum and perineal care as well as maternal education. Information for the patient's :  Nury Elkins [[id number]]   Vaginal, Spontaneous    Patient doing well without significant complaint. Voiding without difficulty, normal lochia. Vitals:  Visit Vitals  /80 (BP 1 Location: Right arm, BP Patient Position: At rest)   Pulse 90   Temp 98.2 °F (36.8 °C)   Resp 15   Wt 117.9 kg (260 lb)   SpO2 97%   Breastfeeding Unknown   BMI 46.06 kg/m²     Temp (24hrs), Av.4 °F (36.9 °C), Min:98 °F (36.7 °C), Max:99.9 °F (37.7 °C)        Exam:   Patient without distress. Abdomen soft, fundus firm, nontender                Perineum with normal lochia noted. Lower extremities are negative for swelling, cords or tenderness. Labs:     Lab Results   Component Value Date/Time    WBC 12.9 (H) 2021 05:14 PM    HGB 10.3 (L) 2021 05:14 PM    HCT 31.5 (L) 2021 05:14 PM    PLATELET 117  05:14 PM       No results found for this or any previous visit (from the past 24 hour(s)).

## 2021-04-10 NOTE — PROGRESS NOTES
Labor Progress Note    CNM assumed pt care. Called to bedside by RN for cervical exam.     Patient seen, fetal heart rate and contraction pattern evaluated. Physical Exam:  Cervical Exam: C/C/+2  Membranes: AROM x 4 hours, clear  Uterine Activity: q 2- 3  Fetal Heart Rate: 120, moderate variability  Accelerations: yes  Decelerations: no    Assessment/Plan:  Reassuring fetal status. Prepare to begin pushing.      Brett Alejandre CNM

## 2021-04-10 NOTE — L&D DELIVERY NOTE
Delivery Summary    Patient: Hilario Sullivan MRN: [id number]  SSN: [id number]    YOB: 1995  Age: 30 y.o. Sex: female       Information for the patient's :  Armando Daniels [[id number]]       Labor Events:    Labor: No    Steroids: None   Cervical Ripening Date/Time: 2021 5:10 PM   Cervical Ripening Type: King/EASI   Antibiotics During Labor: No   Rupture Date/Time: 2021 3:47 PM   Rupture Type: AROM   Amniotic Fluid Volume: Moderate    Amniotic Fluid Description: Clear    Amniotic Fluid Odor: None    Induction: Oxytocin       Induction Date/Time: 2021 4:29 AM    Indications for Induction: Other(comment)    Augmentation: None   Labor complications: None         Delivery Events:  Indications For Episiotomy:     Episiotomy: None   Perineal Laceration(s): 2nd   Repaired: Yes   Repair Suture: Vicryl 3-0   Number of Repair Packets: 2   Quantitative Blood Loss (ml)           665     Delivery Date: 2021    Delivery Time: 9:32 PM  Delivery Type: Vaginal, Spontaneous  Sex:  Female    Gestational Age: 44w3d   Delivery Clinician:  Albert Lopes  Living Status: Living   Delivery Location: L&D  2          APGARS  One minute Five minutes Ten minutes   Skin color: 1   1        Heart rate: 2   2        Grimace: 2   2        Muscle tone: 2   2        Breathin   2        Totals: 9   9            Presentation: Vertex    Resuscitation Method:  Tactile Stimulation;Suctioning-bulb     Meconium Stained: None      Cord Information: 3 Vessels  Complications: None  Cord around:  None  Delayed cord clamping?  Yes  Cord clamped date/time:2021  9:33 PM  Disposition of Cord Blood: Discard    Blood Gases Sent?: No    Placenta:  Date/Time: 2021  9:38 PM  Removal: Spontaneous      Appearance: Normal     Dodson Measurements:  Birth Weight:      8# Madeleine Prose    Other Providers:   JOSEFA ROY;SUMAN SOLITARIO;TONJA GOINS, Primary Nurse;Primary  Nurse;Midwife           Group B Strep:   Lab Results   Component Value Date/Time    GrBStrep, External Negative 03/16/2021

## 2021-04-11 VITALS
RESPIRATION RATE: 16 BRPM | OXYGEN SATURATION: 96 % | BODY MASS INDEX: 46.06 KG/M2 | HEART RATE: 91 BPM | WEIGHT: 260 LBS | SYSTOLIC BLOOD PRESSURE: 123 MMHG | TEMPERATURE: 98 F | DIASTOLIC BLOOD PRESSURE: 80 MMHG

## 2021-04-11 PROCEDURE — 74011250637 HC RX REV CODE- 250/637: Performed by: MIDWIFE

## 2021-04-11 RX ORDER — IBUPROFEN 800 MG/1
800 TABLET ORAL EVERY 8 HOURS
Qty: 30 TAB | Refills: 0 | Status: SHIPPED | OUTPATIENT
Start: 2021-04-11

## 2021-04-11 RX ADMIN — ACETAMINOPHEN 650 MG: 325 TABLET ORAL at 08:29

## 2021-04-11 RX ADMIN — IBUPROFEN 800 MG: 400 TABLET, FILM COATED ORAL at 08:49

## 2021-04-11 RX ADMIN — DOCUSATE SODIUM 100 MG: 100 CAPSULE ORAL at 08:30

## 2021-04-11 RX ADMIN — ACETAMINOPHEN 650 MG: 325 TABLET ORAL at 00:54

## 2021-04-11 RX ADMIN — IBUPROFEN 800 MG: 400 TABLET, FILM COATED ORAL at 00:54

## 2021-04-11 NOTE — LACTATION NOTE
This note was copied from a baby's chart. Baby nursing well and has improved throughout post partum stay, deep latch maintained, mother is comfortable, milk is in transition, baby feeding vigorously with rhythmic suck, swallow, breathe pattern, with audible swallowing, and evident milk transfer, both breasts offered, baby is asleep following feeding. Baby is feeding on demand, voiding (3) and stools (6) present as appropriate since birth. Weight loss:  4.6%    Breasts may become engorged when milk \"comes in\". How milk is made / normal phases of milk production, supply and demand discussed. Taught care of engorged breasts - frequent breastfeeding encouraged, warm compresses and breast massage ac. Then nurse the baby or pump. Apply cold compresses pc x 15 minutes a few times a day for swelling or discomfort. May need to do this care for a couple of days. Discussed prevention and treatment of mastitis.

## 2021-04-11 NOTE — ROUTINE PROCESS
0030- Bedside shift change report given to MADELINE Goldstein (oncoming nurse) by Zoltan Gay RN (offgoing nurse). Report included the following information SBAR.

## 2021-04-11 NOTE — ROUTINE PROCESS
0800 - Bedside shift change report given to ALMAZ Peacock RN (oncoming nurse) by Lonny Villanueva RN (offgoing nurse). Report included the following information SBAR.     1146 - I have reviewed discharge instructions with the patient. The patient verbalized understanding.

## 2021-04-11 NOTE — DISCHARGE INSTRUCTIONS
Patient Education        Learning About Starting to Breastfeed  Planning ahead     Before your baby is born, plan ahead. Learn all you can about breastfeeding. This helps make breastfeeding easier. · Early in your pregnancy, talk to your doctor or midwife about breastfeeding. · Learn the basics before your baby is born. The staff at hospitals and birthing centers can help you find a lactation specialist. This person is often a nurse who has been trained to teach and advise women about breastfeeding. Or you can take a breastfeeding class. · Plan ahead for times when you will need help after your baby is born. Many women get help from friends and family. Some join a support group to talk to other moms who breastfeed. · Buy the equipment you'll need. Examples are breast pads, nipple cream, extra pillows, and nursing bras. Find out about breast pumps too. Getting help from your hospital or birthing center  It's important to have support from the doctors, nurses, and hospital staff who care for you and your baby. Before it's time for you to give birth, ask about the breastfeeding policies at your hospital or birthing center. Look for a hospital or birthing center that has policies for:  · \"Rooming in. \" This policy encourages you to have your baby in the room with you. It can allow you to breastfeed more often. · Supplemental feedings. Tell the staff that your baby is to get only your breast milk from birth. If staff feed your baby water, sugar solution, or formula right after birth without a medical reason, it may make it harder for you to breastfeed. · Pacifiers or artificial nipples. Staff should not give your  these items. They may interfere with breastfeeding. · Follow-up. Find out if your hospital can help you with breastfeeding issues after you go home. See if you can get information on support groups or other contacts.  They might help if you need help setting up and staying with your breastfeeding routine. Your first feeding  It's best to start breastfeeding within 1 hour of birth. For each feeding, you go through these basic steps:  · Get ready for the feeding. Be calm and relaxed, and try not to be distracted. Get some water or juice for yourself. Use two or three pillows to help support your baby while he or she is nursing. · Find a breastfeeding position that is comfortable for you and your baby. Examples are the cradle and the football positions. Make sure the baby's head and chest are lined up straight and facing your breast. It's best to switch which breast you start with each time. · Get the baby latched on well. Your baby's mouth needs to be wide open, like a yawn, so you may need to gently touch the middle of your baby's lower lip. When your baby's mouth is open wide, quickly bring the baby onto your nipple and areola. The areola is the dark [place] around your nipple. · Provide a complete feeding. Let your baby decide how long to nurse. Be sure to burp your baby after each breast.  In the first days after birth, your breasts make a thick, yellow liquid called colostrum. This liquid gives your baby nutrients and antibodies against infection. It is all that babies need at first. Your breasts will fill with milk a few days after the birth. Talk to your doctor, midwife, or lactation specialist right away if you are having problems and aren't sure what to do. How often to breastfeed  Plan to breastfeed your baby on demand rather than setting a strict schedule. For the first 2 weeks, be prepared to breastfeed at least 8 times in a 24-hour period. In the first few days, you may need to wake a sleeping baby to feed. If you breastfeed more often, it will help your breasts to produce more milk. After you go home  After you're home, don't be afraid to call your doctor, midwife, or lactation specialist with questions. That's true even if you don't know what's bothering you.  They are used to parents of newborns calling. They can help you figure out if there is a problem, and if so, how to fix it. Plan for times when you will be apart from your baby. Use a breast pump to collect breast milk ahead of time. You can store milk in the refrigerator or freezer. Then it's ready when someone else will be taking care of your baby. Experts recommend waiting about a month until breastfeeding is going well before offering a bottle. Breastfeeding is a learned skill that gets easier over time. You are more likely to succeed if you plan ahead, learn the basic techniques, and know where to get help and support. Where can you learn more? Go to http://www.gray.com/  Enter Q917 in the search box to learn more about \"Learning About Starting to Breastfeed. \"  Current as of: 2020               Content Version: 12.8   [employer]. Care instructions adapted under license by [employer] (which disclaims liability or warranty for this information). If you have questions about a medical condition or this instruction, always ask your healthcare professional. [place] [address] any warranty or liability for your use of this information. Patient Education   Patient Education         Section: What to Expect at Home  Your Recovery     A  section, or , is surgery to deliver your baby through a cut that the doctor makes in your lower belly and uterus. The cut is called an incision. You may have some pain in your lower belly and need pain medicine for 1 to 2 weeks. You can expect some vaginal bleeding for several weeks. You will probably need about 6 weeks to fully recover. It's important to take it easy while the incision heals. Avoid heavy lifting, strenuous activities, and exercises that strain the belly muscles while you recover. Ask a family member or friend for help with housework, cooking, and shopping.   This care sheet gives you a general idea about how long it will take for you to recover. But each person recovers at a different pace. Follow the steps below to get better as quickly as possible. How can you care for yourself at home? Activity    · Rest when you feel tired. Getting enough sleep will help you recover.     · Try to walk each day. Start by walking a little more than you did the day before. Bit by bit, increase the amount you walk. Walking boosts blood flow and helps prevent pneumonia, constipation, and blood clots.     · Avoid strenuous activities, such as bicycle riding, jogging, weightlifting, and aerobic exercise, for 6 weeks or until your doctor says it is okay.     · Until your doctor says it is okay, do not lift anything heavier than your baby.     · Do not do sit-ups or other exercises that strain the belly muscles for 6 weeks or until your doctor says it is okay.     · Hold a pillow over your incision when you cough or take deep breaths. This will support your belly and decrease your pain.     · You may shower as usual. Pat the incision dry when you are done.     · You will have some vaginal bleeding. Wear sanitary pads. Do not douche or use tampons until your doctor says it is okay.     · Ask your doctor when you can drive again.     · You will probably need to take at least 6 weeks off work. It depends on the type of work you do and how you feel.     · Ask your doctor when it is okay for you to have sex. Diet    · You can eat your normal diet. If your stomach is upset, try bland, low-fat foods like plain rice, broiled chicken, toast, and yogurt.     · Drink plenty of fluids (unless your doctor tells you not to).     · You may notice that your bowel movements are not regular right after your surgery. This is common. Try to avoid constipation and straining with bowel movements. You may want to take a fiber supplement every day.  If you have not had a bowel movement after a couple of days, ask your doctor about taking a mild laxative.     · If you are breastfeeding, limit alcohol. Alcohol can cause a lack of energy and other health problems for the baby when a breastfeeding woman drinks heavily. It can also get in the way of a mom's ability to feed her baby or to care for the child in other ways. There isn't a lot of research about exactly how much alcohol can harm a baby. Having no alcohol is the safest choice for your baby. If you choose to have a drink now and then, have only one drink, and limit the number of occasions that you have a drink. Wait to breastfeed at least 2 hours after you have a drink to reduce the amount of alcohol the baby may get in the milk. Medicines    · Your doctor will tell you if and when you can restart your medicines. He or she will also give you instructions about taking any new medicines.     · If you take aspirin or some other blood thinner, ask your doctor if and when to start taking it again. Make sure that you understand exactly what your doctor wants you to do.     · Take pain medicines exactly as directed. ? If the doctor gave you a prescription medicine for pain, take it as prescribed. ? If you are not taking a prescription pain medicine, ask your doctor if you can take an over-the-counter medicine.     · If you think your pain medicine is making you sick to your stomach:  ? Take your medicine after meals (unless your doctor has told you not to). ? Ask your doctor for a different pain medicine.     · If your doctor prescribed antibiotics, take them as directed. Do not stop taking them just because you feel better. You need to take the full course of antibiotics. Incision care    · If you have strips of tape on the incision, leave the tape on for a week or until it falls off.     · Wash the area daily with warm, soapy water, and pat it dry. Don't use hydrogen peroxide or alcohol, which can slow healing.  You may cover the area with a gauze bandage if it weeps or rubs against clothing. Change the bandage every day.     · Keep the area clean and dry. Other instructions    · If you breastfeed your baby, you may be more comfortable while you are healing if you place the baby so that he or she is not resting on your belly. Try tucking your baby under your arm, with his or her body along the side you will be feeding on. Support your baby's upper body with your arm. With that hand you can control your baby's head to bring his or her mouth to your breast. This is sometimes called the football hold. Follow-up care is a key part of your treatment and safety. Be sure to make and go to all appointments, and call your doctor if you are having problems. It's also a good idea to know your test results and keep a list of the medicines you take. When should you call for help? Call  911 anytime you think you may need emergency care. For example, call if:    · You have thoughts of harming yourself, your baby, or another person.     · You passed out (lost consciousness).     · You have chest pain, are short of breath, or cough up blood.     · You have a seizure. Call your doctor now or seek immediate medical care if:    · You have pain that does not get better after you take pain medicine.     · You have severe vaginal bleeding.     · You are dizzy or lightheaded, or you feel like you may faint.     · You have new or worse pain in your belly or pelvis.     · You have loose stitches, or your incision comes open.     · You have symptoms of infection, such as:  ? Increased pain, swelling, warmth, or redness. ? Red streaks leading from the incision. ? Pus draining from the incision. ? A fever.     · You have symptoms of a blood clot in your leg (called a deep vein thrombosis), such as:  ? Pain in your calf, back of the knee, thigh, or groin. ? Redness and swelling in your leg or groin.     · You have signs of preeclampsia, such as:  ? Sudden swelling of your face, hands, or feet.   ? New vision problems (such as dimness, blurring, or seeing spots). ? A severe headache. Watch closely for changes in your health, and be sure to contact your doctor if:    · You do not get better as expected. Where can you learn more? Go to http://www.liao.com/  Enter M806 in the search box to learn more about \" Section: What to Expect at Home. \"  Current as of: 2020               Content Version: 12.8   [employer]. Care instructions adapted under license by [employer] (which disclaims liability or warranty for this information). If you have questions about a medical condition or this instruction, always ask your healthcare professional. [address] any warranty or liability for your use of this information. Depression After Childbirth: Care Instructions  Overview     Many women get the \"baby blues\" during the first few days after childbirth. You may lose sleep, feel irritable, and cry easily. You may feel happy one minute and sad the next. Hormone changes are one cause of these emotional changes. Also, the demands of a new baby, along with visits from relatives or other family needs, can add to the stress. The \"baby blues\" often peak around the fourth day. Then they ease up in less than 2 weeks. If your moodiness or anxiety lasts for more than 2 weeks, or if you feel like life is not worth living, you may have postpartum depression. This is different for each person. Some mothers with serious depression may worry intensely about their infant's well-being. Others may feel distant from their child. Some mothers may even feel that they might harm their baby. Some may have signs of paranoia, wondering if someone is watching them. Depression is not a sign of weakness. It's a medical condition that requires treatment. Medicine and counseling often work well to reduce depression.  Talk to your doctor about taking antidepressant medicine while breastfeeding. Follow-up care is a key part of your treatment and safety. Be sure to make and go to all appointments, and call your doctor if you are having problems. It's also a good idea to know your test results and keep a list of the medicines you take. How do you know if you are depressed? With all the changes in your life, you may not know if you are depressed. Pregnancy sometimes causes changes in how you feel that are similar to the symptoms of depression. Symptoms of depression include:  · Feeling sad or hopeless and losing interest in daily activities. These are the most common symptoms of depression. · Sleeping too much or not enough. · Feeling tired. You may feel as if you have no energy. · Eating too much or too little. · Writing or talking about death, such as writing suicide notes or talking about guns, knives, or pills. Keep the numbers for these national suicide hotlines: 2-344-711-TALK (4-790.719.2558) and 4-575-MKVTAZC (2-359.253.6593). If you or someone you know talks about suicide or feeling hopeless, get help right away. How can you care for yourself at home? · Be safe with medicines. Take your medicines exactly as prescribed. Call your doctor if you think you are having a problem with your medicine. · Eat a healthy diet so that you can keep up your energy. · Get regular daily exercise, such as walks, to help improve your mood. · Get as much sunlight as possible. Keep your shades and curtains open. Get outside as much as you can. · Avoid using alcohol or other substances to feel better. · Get as much rest and sleep as possible. Avoid doing too much. Being too tired can increase depression. · Play stimulating music throughout your day and soothing music at night. · Schedule outings and visits with friends and family. Ask them to call you regularly, so that you don't feel alone. · Ask for help with preparing food and other daily tasks.  Family and friends are often happy to help with a . · Be honest with yourself and those who care about you. Tell them about your struggle. · Join a support group of new mothers. No one can better understand the challenges of caring for a  than other new mothers. · If you feel like life is not worth living or you're feeling hopeless, get help right away. Keep the numbers for these national suicide hotlines: 9-569-033-TALK (8-411.136.6215) and 6-862-SBOSQSE (7-529.286.2851). When should you call for help? Call 911 anytime you think you may need emergency care. For example, call if:    · You feel you cannot stop from hurting yourself, your baby, or someone else. Call your doctor now or seek immediate medical care if:    · You are having trouble caring for yourself or your baby.     · You hear voices. Watch closely for changes in your health, and be sure to contact your doctor if:    · You have problems with your depression medicine.     · You do not get better as expected. Where can you learn more? Go to http://www.gray.com/  Enter [id number] in the search box to learn more about \"Depression After Childbirth: Care Instructions. \"  Current as of: 2020               Content Version: 12.8  © 0200-5910 [employer]. Care instructions adapted under license by [employer] (which disclaims liability or warranty for this information). If you have questions about a medical condition or this instruction, always ask your healthcare professional. [place] [address] any warranty or liability for your use of this information.

## 2021-04-11 NOTE — PROGRESS NOTES
Post-Partum Day Number 2 Progress Note    Tracey Smith     Assessment: Doing well, post partum day 2    Plan:   1. Discharge home today  2. Follow up in office in 6 weeks with Marquis Jesus Code,*  3. Post partum activity advised, diet as tolerated  4. Discharge Medications: ibuprofen, percocet and medications prior to admission    Information for the patient's :  Casandra Fermin [[id number]]   Vaginal, Spontaneous    Patient doing well without significant complaint. Voiding without difficulty, normal lochia. Vitals:  Visit Vitals  /74 (BP 1 Location: Right upper arm, BP Patient Position: At rest)   Pulse 96   Temp 98 °F (36.7 °C)   Resp 16   Wt 117.9 kg (260 lb)   SpO2 96%   Breastfeeding Unknown   BMI 46.06 kg/m²     Temp (24hrs), Av.9 °F (36.6 °C), Min:97.8 °F (36.6 °C), Max:98 °F (36.7 °C)      Exam:         Patient without distress. Abdomen soft, fundus firm, nontender                 Lower extremities are negative for swelling, cords or tenderness. Labs:     Lab Results   Component Value Date/Time    WBC 12.9 (H) 2021 05:14 PM    HGB 10.3 (L) 2021 05:14 PM    HCT 31.5 (L) 2021 05:14 PM    PLATELET 214  05:14 PM       No results found for this or any previous visit (from the past 24 hour(s)).

## 2021-04-11 NOTE — DISCHARGE SUMMARY
Obstetrical Discharge Summary     Name: Mary Castelan MRN: [id number]  SSN: [id number]    YOB: 1995  Age: 30 y.o. Sex: female      Admit Date: 2021    Discharge Date: 2021     Admitting Physician: Brittany Murphy MD     Attending Physician:  Libby Collins, Ramon Rodriguez,*     Admission Diagnoses: Pregnancy [M67.65]  Obesity complicating pregnancy [Y06.290]  Large for gestational age fetus affecting management of mother [O36.60X0]    Discharge Diagnoses:   Information for the patient's :  Jodi Severino [[id number]]   Delivery of a 3.875 kg female infant via Vaginal, Spontaneous on 2021 at 9:32 PM  by Sami Tidwell. Apgars were 9  and 9 . Additional Diagnoses:   Hospital Problems  Never Reviewed          Codes Class Noted POA    Pregnancy ICD-10-CM: Z34.90  ICD-9-CM: V22.2  2021 Unknown        Obesity complicating pregnancy FRF-05-HW: O99.210  ICD-9-CM: 649.10  2021 Unknown        Large for gestational age fetus affecting management of mother ICD-10-CM: O36.60X0  ICD-9-CM: 656.60  2021 Unknown             Lab Results   Component Value Date/Time    Rubella, External Immune 2020    GrBStrep, External Negative 2021       Hospital Course: Normal hospital course following the delivery. Patient Instructions:   Current Discharge Medication List      START taking these medications    Details   ibuprofen (MOTRIN) 800 mg tablet Take 1 Tab by mouth every eight (8) hours. Qty: 30 Tab, Refills: 0         CONTINUE these medications which have NOT CHANGED    Details   PNV Comb #2-Iron-Omega 3-FA 99-1-704-200 mg cmpk Take  by mouth.          STOP taking these medications       aspirin delayed-release 81 mg tablet Comments:   Reason for Stopping:         doxylamine succinate (UNISOM) 25 mg tablet Comments:   Reason for Stopping:               Disposition at Discharge: Home or self care    Condition at Discharge: Stable    Reference my discharge instructions.     Follow-up Appointments   Procedures    FOLLOW UP VISIT Appointment in: 6 Weeks     Standing Status:   Standing     Number of Occurrences:   1     Order Specific Question:   Appointment in     Answer:   6 Weeks        Signed By:  Levie Fleischer, MD     April 11, 2021

## 2021-04-12 NOTE — ADT AUTH CERT NOTES
[address]     FACILITY NPI : [id number]     [facility]  [facility] 3W MOTHER INFANT  [address]  [phone]            Patient Name :Millie Pearson   : 1995 (25 yrs)  MRN : [id number]     Patient Mailing Address [address] [place] [address] [47] , [address]                                                               .         Insurance Plan Payor: BLUE CROSS / Plan: [facility] PPO / Product Type: PPO /      Primary Coverage Subscriber ID : [id number]            Current Patient Class : INPATIENT  Admit Date : 2021     REQUESTED LEVEL OF CARE: INPATIENT [101]                                                           Diagnosis : Pregnancy;Obesity complicating pregnancy; Large for gestational age fetus affecting management of mother                          ICD10 Code : Pregnancy [F94.38]  Obesity complicating pregnancy [N96.232]  Large for gestational age fetus affecting management of mother [O42.56X0]       Admitting and Attending Info:  Admitting Provider : Taina Peralta MD   NPI: [id number]  Admitting Provider Phone.  [phone]  Admitting Provider Address: [address], [address]     Attending Provider No att. providers found   NPI  Attending Provider Address:  SEE NOTES TAB          BABY INFORMATION :     Name :  Mickey Timmons    :   2021 (0 dy)      Delivery Type :        Weight in Grams :   3875 g      GA  :   40      Apgar 1 Min :   9  [9]      Apgar 5 Min :   9  [9]      Unit:   [facility] 3  NURSERY                Patient Demographics    Patient Name   Lalo Nava Sex   Female    1995 Address   [address] [address] Phone   [phone] [facility]   [phone] [facility]   Hospital Account    Name Acct ID Class Status Primary Coverage   Lalo Nava [id number] [address] Discharged/Not Billed [place]UE CROSS - [place] BL[place] [facility] PPO          Guarantor Account (for Hospital Account [de-identified])    Name Relation to Pt Service Area Active? Acct Type   Sheeba Flood Self [address] Yes Personal/Family   Address Phone     [address] [facility],  [address] [facility] [address] [phone](P)            Coverage Information (for Hospital Account [de-identified])    F/O Payor/Plan Subscriber  Subscriber Sex Precert #   BLUE CROSS/[place] BLUE CROSS [facility] PPO 89 F    Subscriber Subscriber #   Sheeba Flood [id number]   Grp # Group Name   [id number]    Address Phone   Matilde Sanchez [address]    Policy Number Status Effective Date Benefits Phone   [id number] -  -   Auth/Cert   REF# [id number]          Admission Information    Arrival Date/Time: 2021 1608 Admit Date/Time: 2021 1608 IP Adm.  Date/Time: 2021 1608   Admission Type: Urgent Point of Origin: Non-health Care Facility/self Admit Category:    Means of Arrival:  Primary Service: Obstetrics Secondary Service: N/A   Transfer Source:  Service Area: [facility] Unit: [address] [place] [address] MOTHER INFANT   Admit Provider: Jen Kelsey MD Attending Provider: Elton Scruggs MD Referring Provider:    Admission Information    Attending Provider Admission Dx Admitted on    Pregnancy, Obesity complicating pregnancy, Large for gestational age fetus affecting management of mother 21   Service Isolation Code Status   OBSTETRICS  Prior   Allergies Advance Care Planning    No Known Allergies Jump to the Activity     Admission Information    Unit/Bed: [address] [place] [address] MOTHER INFANT/ Service: OBSTETRICS   Admitting provider: Jen Kelsey MD Phone: [phone]   Attending provider:  Phone:    PCP: None Phone:    Admission dx: Z34.90 Patient class: I   Admission type: UR     Patient Demographics    Patient Name   Rosa Snell   [id number] Sex   Female    1995 Address   [address] [place] [address] Phone   [phone] (Home)   [phone] (Mobile)   H&P Notes     H&P by Ephraim Bence, MD at 21 documented on Admission (Discharged) from 2021 in [address]  Author: Ephraim Bence, MD Author Type: Physician Filed: 21 2519   Note Status: Signed Cosign: Cosign Not Required Date of Service: 21   : Ephraim Bence, MD (Physician)      History & Physical     Name: Pio Garrison MRN: [id number]  SSN: [id number]    YOB: 1995  Age: 30 y.o. Sex: female       Subjective:      Estimated Date of Delivery: 21                    OB History    Para Term  AB Living   1             SAB TAB Ectopic Molar Multiple Live Births                      # Outcome Date GA Lbr Bronson/2nd Weight Sex Delivery Anes PTL Lv   1 Current                           Ms. Pastrana is admitted with pregnancy at 40w0d for induction of labor due to obesity and LGA baby at term. Baby noted to be 91st%ile on  (Merrick Peterson). She is obese with a BMI of 41. Prenatal course was otherwise uncomplicated. Please see prenatal records for details.     She notes active FM and denies labor complaints.      History reviewed. No pertinent past medical history. History reviewed. No pertinent surgical history. Social History           Occupational History    Not on file   Tobacco Use    Smoking status: Never Smoker    Smokeless tobacco: Never Used   Substance and Sexual Activity    Alcohol use: Not Currently    Drug use: Never    Sexual activity: Not on file      History reviewed. No pertinent family history.     No Known Allergies          Prior to Admission medications    Medication Sig Start Date End Date Taking?  Authorizing Provider   PNV Comb #2-Iron-Omega 3-FA 79-7-504-200 mg cmpk Take  by mouth.     Yes Provider, Historical   aspirin delayed-release 81 mg tablet Take 81 mg by mouth daily.     Yes Provider, Historical   doxylamine succinate (UNISOM) 25 mg tablet Take 25 mg by mouth nightly as needed.     Yes Provider, Historical         Review of Systems   Noted in HPI     Objective:      Vitals:       Vitals:     21 1612 21 1619   BP: 125/81     Pulse: (!) 136     Resp: 14     Temp: 98.1 °F (36.7 °C)     Weight:   117.9 kg (260 lb)         Physical Exam       Gen: NAD  Resp: non-labored  CV: RRR  Abd: soft, gravid, obese  Ext: mild b/l edema     EFW 9#     Cervix: 4QJ/34%/-6, intact, cephalic     Category 1 fetatl heart tracing  Baseline 145, moderate variability, + accels, no decels        Prenatal Labs:           Lab Results   Component Value Date/Time     Rubella, External Immune 2020     GrBStrep, External Negative 2021     HBsAg, External Negative 2020     RPR, External Non reactive 2020     Gonorrhea, External Negative 2020     Chlamydia, External Negative 2020     ABO,Rh A Positive 2020            Impression/Plan:      Active Problems:    Pregnancy (2021)       Obesity complicating pregnancy (3/2/6925)       Large for gestational age fetus affecting management of mother (2021)        G1 40 0/7 IOL for obesity (BMI 41) and LGA baby (9lb3oz)     Plan:  Admit for induction of labor. Group B Strep negative. Plan cook catheter placement and pitocin at 0400. Consented x 2.  COVID neg.   Reviewed risks of shoulder dystocia (which had already been reviewed with the patient) and she accepts risks and would like to proceed forward with Dionisio Farmer MD      Patient Demographics    Patient Name   Tony Gay   [id number] Sex   Female    1995 Address   [address] [place] [address] Phone   [phone] (Home)   [phone] (Mobile)   CSN:   [id number]   Admit Date: Admit Time Room Bed   2021  4:08 PM [phone]   Attending Providers    Provider Pager From To   Vangie Jose MD  21   Emergency Contact(s)    Name Relation Home Work [place] Spouse [phone]  [phone]     Lennox Bohr  MRN: [id number]   Link to Baby  Comment     Last edited by  on  at    Baby's name MRN Account  Age Sex Admission Type   Berton Schirmer [id number] 21 3 days F Confirmed Discharge   OB History       1    Para   1    Term   1            AB        Living   1      SAB        TAB        Ectopic        Molar        Multiple   0    Live Births   1         # Outcome Date GA Labor/2nd Weight Sex Delivery Anes PTL Lv A1 A5   1 Term 21 40w1d 4h 45m / 1h 17m 3.875 kg F Vag-Spont Epidural N Living 9 9   Name: Tisha Ron   Location: Other   Delivering Clinician: Kulwant Dyer CNM      Prenatal History     Most Recent Value   Did You Receive Prenatal Care Yes   Name Of OB Provider Clipp   Seen By MFM (Maternal Fetal Medicine)? Yes  [LGA]   Fetal Ultrasound Abnormalities/Concerns? Yes  [LGA]   Infant Feeding Breast Milk   Circumcision Planned No   Pediatrician After Birth/ Follow Up Baby Visits [facility]   Dating Summary    Working GLENDA: 21 set by Chun Neff RN on 21 based on Other Basis   Based On GLENDA GA Diff GA User Date   Other Basis  0 Comment: patient stated 21 Working  Chun Neff RN 21   Prenatal Results    Prenatal Labs    Test Value Date Time   ABO/Rh * A Positive  20    Antibody Scrn      Hgb      Hct      Platelets      Rubella * Immune  20    RPR * Non reactive  20    T.  Pallidum Antibody      Urine      Hep B Surf Ag * Negative  20    Hep C      HIV      Gonorrhea * Negative  20    Chlamydia * Negative  20    TSH      GTT, 1 HR (Glucola)      GTT, Fasting      GTT, 1 HR      GTT, 2 HR      GTT, 3 HR      3rd Trimester    Test Value Date Time   Hgb      Hct      Platelets      Group B Strep * Negative  21    Antibody Scrn      TSH      T. Pallidum Antibody      Hep B Surf Ag      Gonorrhea Chlamydia       Screening/Diagnostics    Test Value Date Time   AFP Only      AFP Tetra      Hgb Electrophoresis      Amniocentesis      Cystic Fibrosis      Thalessemia      Froy-Sachs      Canavan      PAP Smear      Beta HCG      NT      NIPT      COVID-19      Lab    Test Value Date Time   GTT, Fasting      GTT, 1HR      GTT, 2HR      GTT, 3HR      RPR * Non reactive  20    Beta HCG      CMV Ab      Toxoplasma Ab      Varicella Zoster Ab         Legend    *: Historical  View all results for this pregnancy   Farshad Thayer [[id number]]     Delivery    Birth date/time: 2021 21:32:00  Delivery type: Vaginal, Spontaneous  Labor Event Times    Labor onset date/time: 2021 1530  Dilation complete date/time: 2021  Start pushing date/time: 2021  Labor Events     labor?: No   steroids?: None  Cervical ripenin2021 171  Cervical ripening type: King/EASI  Antibiotics during labor?: No  Rupture date/time: 2021 1547  Rupture type: AROM  Fluid color: Clear  Fluid odor: None  Induction: Oxytocin  Induction date/time: 2021 0429  Induction indications: Other(comment)  Augmentation: None  Labor/Delivery complications: None  Delivery Providers    Delivering clinician: Karuna Alexandre CNM  Provider Role   Vijay BLACKMAN Primary Nurse   Radha Staton RN Primary [place] Nurse   Karuna Alexandre CNM Midwife   Anesthesia    Method: Epidural  Multiple Birth Onset Second Stage    Second Stage Start Date: 21 Second Stage Start Time:    Operative Delivery    Forceps attempted?: No  Vacuum extractor attempted?: No  Presentation    Presentation: Vertex  Apgars    Living status: Living  Apgars:  1 min. :  5 min.:  10 min. :  15 min.:  20 min.:    Skin color:  1  1       Heart rate:  2  2       Reflex irritability:  2  2       Muscle tone:  2  2       Respiratory effort:  2  2       Total:  9  9       Apgars assigned by: HYUN  [facility] RN  Resuscitation    Method: Tactile Stimulation, Suctioning-bulb  Shoulder Dystocia    Shoulder dystocia present?: No  Measurements    Weight: 3875 g  Weight (lbs): 8 lb 8.7 oz  Length: 53.3 cm  Length (in): 21\"  Head circumference: 35 cm  Chest circumference: 35 cm  Lacerations    Episiotomy: None    Lacerations: 2nd  Repair Needed: Vicryl 3-0  # of Repair Packets: 2  Suture Type and Size:   Suture Comment:   Estimated Blood Loss (mL):     Placenta    Placenta Date/Time: 4/9/2021 2138  Removal: Spontaneous  Appearance: Normal Placenta disposition: Discarded   Vaginal Counts    Initial count personnel: Won Jin OBT/ YEHUDA ROY RN  Final count personnel: Won Jin OBT/ E BRISEIDA MELISSA  Accurate final count?: Yes  Skin to Skin    Skin to skin initiated date/time: 4/9/2021 1932  Skin to skin with:  Mother  Delivery Summary History    Event User Date/Time   Signed Yissel Dominguez 4/9/2021 23:14:17